# Patient Record
(demographics unavailable — no encounter records)

---

## 2024-10-08 NOTE — PHYSICAL EXAM
[FreeTextEntry1] : SC present for exam  [de-identified] :  Mastectomy site healing well, seroma noted at left mastectomy but continues to improve

## 2024-10-08 NOTE — ASSESSMENT
[FreeTextEntry1] : IMP:  77yo F w/ known left breast cancer s/p lumpectomies 6/2019 and 4/2022 -- with no adjuvant RT or endocrine therapy (pt with follow-up) now with known multifocal disease to Left breast   Left total mastectomy and L axillary node dissection on 8/23/2024. Pathology: 0/7 LN negative, invasive ductal carcinoma, 3.4 cm, invasive lobular carcinoma, 0.7 cm. negative margins. ER/[place] + HER2-  9/3-RULA removed in office 9/10- 160 cc aspirated  9/17/24: 130 cc aspirated No fluid returned today. Swelling much improved.   PLAN: Return in 6 weeks F/U w/ med/onc- pt still contemplating if she wants to see med/onc   All medical entries were at my, Dr. Bryon Kathleen, direction. I have reviewed the chart and agree that the record accurately reflects my personal performance of the history, physical exam, assessment and plan. Our office Nurse Practitioner was present of the duration of the office visit.

## 2024-10-08 NOTE — HISTORY OF PRESENT ILLNESS
[de-identified] : Ms. MACY SANDOVAL is a 76-year-old woman who returns for a post op visit.  She was initially seen in consultation on 2/1/2022.  She has a prior history of invasive ductal carcinoma of the right breast status post lumpectomy 6/2019 with Dr. Tashi Fountain (pathology T1N0, ER+/[place]+/HER2-, negative margins). She did not undergo radiation and is under the impression that the pathology was benign.     She is s/p Bilateral breast lumpectomies after Magseed placement, four sites on the left, one on the right and left axillary SLNB on 4/18/2022.  **It is of note that I strongly encouraged the patient to undergo mastectomies, but she was adamant about breast conservation. 1) Left breast medial lumpectomy: 2.5 cm IDC and DCIS, 2 left sentinel nodes w/ metastatic disease (T2N1a) ER+/[place]+/HER2- (*DCIS involves the inferior margin) 2) Left breast lateral lumpectomy: DCIS 3) Right lumpectomy: LCIS, ADH  B/L mammo/sono 7/17/23:  Irregular focal asymmetry in the lower inner left breast by mammography corresponding to the palpable area of concern and corresponding to suspicious heterogeneous masses at 9:00 and 10:00 on sonography. Ultrasound-guided core biopsy of the mass at 9:00 is recommended. Further management of the mass at 10:00 will depend on the results of this biopsy. BIRADS 5   Left breast 9:00 N4 biopsy 12/6/23: Invasive moderately differentiated ductal carcinoma, with solid papillary and mucinous features. Measures 13mm.   Her past medical history includes hypertension, hyperlipidemia (patient defers medical management), intermittent asthma (uses rescue inhaler on occasion) and constipation (on linzess).  She has a family history of breast cancer involving her maternal aunt (post-menopausal)  She is adamant that she will not have a mastectomy.  breast MRI 1/2024  - bx proven malignancy to central inner breast extending to surface measuring up to 2.6 cm, additional mass in the subareolar left breast also highly suspicious for malignancy, additional clumped nonmass enhancement in the central slightly lateral left breast, spanning 6.3 cm suspicious for additional disease  MR biopsies 3/5/2024 ([facility]) - left 3:00 N6 (cork): microinvasive carcinoma, DCIS - Left RA (stoplight): invasive mammary ductal carcinoma w/ mucinous features in this sampling, DCIS   5/21/2024: Tele health discussion included Macy and her niece. Macy is still adamant of having a lumpectomy, but her niece understands the importance of the plan for a double mastectomy.   6/4/2024: Patient presents for discussion of mastectomy with her niece.  The patient now understands the rationale for the mastectomy I explained that it could be done with immediate reconstruction so the patient was referred to a plastic surgeon to discuss reconstructive options.  Left total mastectomy and L axillary node dissection on 8/23/2024. Pathology: 0/7 LN negative, invasive ductal carcinoma, 3.4 cm, invasive lobular carcinoma, 0.7 cm. negative margins. ER/[place] + HER2-   9/10/2024: Macy presents with left breast mastectomy bed seroma and discomfort  9/17/2024: Macy presents continuous left breast mastectomy bed seroma and discomfort  10/8/2024: Macy presents with discomfort. Seroma continues to improve

## 2024-10-08 NOTE — HISTORY OF PRESENT ILLNESS
[de-identified] : Ms. MACY SANDOVAL is a 76-year-old woman who returns for a post op visit.  She was initially seen in consultation on 2/1/2022.  She has a prior history of invasive ductal carcinoma of the right breast status post lumpectomy 6/2019 with Dr. Tashi Fountain (pathology T1N0, ER+/MD+/HER2-, negative margins). She did not undergo radiation and is under the impression that the pathology was benign.     She is s/p Bilateral breast lumpectomies after Magseed placement, four sites on the left, one on the right and left axillary SLNB on 4/18/2022.  **It is of note that I strongly encouraged the patient to undergo mastectomies, but she was adamant about breast conservation. 1) Left breast medial lumpectomy: 2.5 cm IDC and DCIS, 2 left sentinel nodes w/ metastatic disease (T2N1a) ER+/MD+/HER2- (*DCIS involves the inferior margin) 2) Left breast lateral lumpectomy: DCIS 3) Right lumpectomy: LCIS, ADH  B/L mammo/sono 7/17/23:  Irregular focal asymmetry in the lower inner left breast by mammography corresponding to the palpable area of concern and corresponding to suspicious heterogeneous masses at 9:00 and 10:00 on sonography. Ultrasound-guided core biopsy of the mass at 9:00 is recommended. Further management of the mass at 10:00 will depend on the results of this biopsy. BIRADS 5   Left breast 9:00 N4 biopsy 12/6/23: Invasive moderately differentiated ductal carcinoma, with solid papillary and mucinous features. Measures 13mm.   Her past medical history includes hypertension, hyperlipidemia (patient defers medical management), intermittent asthma (uses rescue inhaler on occasion) and constipation (on linzess).  She has a family history of breast cancer involving her maternal aunt (post-menopausal)  She is adamant that she will not have a mastectomy.  breast MRI 1/2024  - bx proven malignancy to central inner breast extending to surface measuring up to 2.6 cm, additional mass in the subareolar left breast also highly suspicious for malignancy, additional clumped nonmass enhancement in the central slightly lateral left breast, spanning 6.3 cm suspicious for additional disease  MR biopsies 3/5/2024 ([facility]) - left 3:00 N6 (cork): microinvasive carcinoma, DCIS - Left RA (stoplight): invasive mammary ductal carcinoma w/ mucinous features in this sampling, DCIS   5/21/2024: Tele health discussion included Macy and her niece. Macy is still adamant of having a lumpectomy, but her niece understands the importance of the plan for a double mastectomy.   6/4/2024: Patient presents for discussion of mastectomy with her niece.  The patient now understands the rationale for the mastectomy I explained that it could be done with immediate reconstruction so the patient was referred to a plastic surgeon to discuss reconstructive options.  Left total mastectomy and L axillary node dissection on 8/23/2024. Pathology: 0/7 LN negative, invasive ductal carcinoma, 3.4 cm, invasive lobular carcinoma, 0.7 cm. negative margins. ER/MD + HER2-   9/10/2024: Macy presents with left breast mastectomy bed seroma and discomfort  9/17/2024: Macy presents continuous left breast mastectomy bed seroma and discomfort  10/8/2024: Macy presents with discomfort. Seroma continues to improve

## 2024-10-08 NOTE — ASSESSMENT
[FreeTextEntry1] : IMP:  75yo F w/ known left breast cancer s/p lumpectomies 6/2019 and 4/2022 -- with no adjuvant RT or endocrine therapy (pt with follow-up) now with known multifocal disease to Left breast   Left total mastectomy and L axillary node dissection on 8/23/2024. Pathology: 0/7 LN negative, invasive ductal carcinoma, 3.4 cm, invasive lobular carcinoma, 0.7 cm. negative margins. ER/ID + HER2-  9/3-RULA removed in office 9/10- 160 cc aspirated  9/17/24: 130 cc aspirated No fluid returned today. Swelling much improved.   PLAN: Return in 6 weeks F/U w/ med/onc- pt still contemplating if she wants to see med/onc   All medical entries were at my, Dr. Bryon Kathleen, direction. I have reviewed the chart and agree that the record accurately reflects my personal performance of the history, physical exam, assessment and plan. Our office Nurse Practitioner was present of the duration of the office visit.

## 2024-10-08 NOTE — PHYSICAL EXAM
[FreeTextEntry1] : SC present for exam  [de-identified] :  Mastectomy site healing well, seroma noted at left mastectomy but continues to improve

## 2024-10-22 NOTE — CONSULT LETTER
[Dear  ___] : Dear  [unfilled], [Consult Letter:] : I had the pleasure of evaluating your patient, [unfilled]. [Please see my note below.] : Please see my note below. [Consult Closing:] : Thank you very much for allowing me to participate in the care of this patient.  If you have any questions, please do not hesitate to contact me. [Sincerely,] : Sincerely, [FreeTextEntry3] : Aayush Daniel M.D.

## 2024-10-22 NOTE — PROCEDURE
[FreeTextEntry1] : Nerve conduction studies and electromyography [FreeTextEntry2] : Rule out right carpal tunnel syndrome or cervical radicular [FreeTextEntry3] : Electrodiagnostic testing was performed in the right upper extremity.  The radial sensory potential was mildly low in amplitude.  The median and ulnar sensory potentials were normal.  The radial and ulnar conduction velocities were mildly slow.  There was slowing of median conduction across the wrist segment.  The median motor distal latency was very prolonged.  The median compound muscle action potential was normal and conduction velocity was mildly slow.  The ulnar motor distal latency was mildly prolonged but the compound muscle action potential and conduction velocity were normal.  Recording the first dorsal interosseous, the compound muscle action potential was mildly low in amplitude and distal motor latency was approaching the upper limit of normal.  The median F wave was prolonged.  The ulnar F wave was normal.  Needle electromyography was performed in several right upper extremity and cervical paraspinal muscles.  There was mild spontaneous activity in the abductor pollicis brevis.  All other muscles were silent at rest.  There was mildly decreased recruitment in the abductor pollicis brevis.  All other muscles recruited normally.  Conclusions: This was an abnormal study.  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  There were only mild acute and chronic motor axon changes.  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  There was no electrophysiologic evidence of right cervical radiculopathy, brachial plexopathy or motor polyneuropathy.  The mildly low amplitude radial sensory potential was of uncertain significance with possibilities including a radial sensory mononeuropathy or a sensory polyneuropathy.

## 2024-10-28 NOTE — ASSESSMENT
[FreeTextEntry1] : 76F here for chronic UTI and Bl nephrolithiasis  #Chronic UTI -Pt to start on vaginal Estrace if okay'd by surgical oncologist -WIll prescribe ellura  #nephrolithiasis Pt to get Renal ULS and do LL in 6 months

## 2024-10-28 NOTE — HISTORY OF PRESENT ILLNESS
[Urinary Urgency] : urinary urgency [Nocturia] : nocturia [None] : None [FreeTextEntry1] : : Dec 22 1947  Referring Provider: none   HPI: Ms. MACY SANDOVAL is a 74 year yo F with a PMHx notable for invasive ductal carcinoma of right breast (s/p lumpectomy , T1BN0, ER+/[place]+/HER2-) with colonic thickening monitored by colonoscopy, HTN and HLD, intermittent asthma, constipation (on linzess) here today for follow up for kidney stones.   Prior LL demonstrated: good UOP, SS CaOx, elevated Ox, SS CaP and elevated urinary pH with elevated Suzan. Pt was advised to increase fluid intake and reduce oxalate intake. She returns today for follow-up. She must avoid salt, increase her citrate/protein intake and reduce her oxalate intake. She has no flank pain or fever. She has not been seen in 2 years. She switched to vegan diet 2022 as a form of therapy for her breast cancer.  She had US in 2020 that showed 5mm RLP and 5mm LLP stones. No hydro. Pt denies flank pain and heamturia.  : CT scan with bilateral (R sided 3, L sided 1) punctate 1-2 mm stones. No flank pain and discomfort. Litholink submitted, pending review. Diet modification reviewed at length- increasing fluids (primarily water), citrus is good, and decreasing/moderating salt, animal flesh protein, oxalate containing foods, and moderation of calcium intake (1000 mg/day is USRDA). I reviewed with the patient the risks of metabolic stone disease given their underlying risk parameters (all of which include large stones, multiple stones, bilateral stones, family history, and young age), and the indications for 24 hour urine metabolic assessment. We also discussed benefits of regular exercise and weight loss as independent risk reducers for stones  2023: Telehealth visit today on [facility]. History of recent health, disease symptoms and mediation review completed. Limited exam but on video able to view limited exam of mucous membranes, skin, demonstration of joint range of motion, gain. No medication side effects noted. Discussed lab testing. Verbal consent given on 2023 at 13:24 by MACY SANDOVAL.  Here today to discuss results of recent litholink analysis. Found to have excellent volume, yet Oxalate level high at 60s, patient endorses eating spinach salads. We discussed her SS CaOX ratio which is elevated and oxalate should be <40. Urine volume excellent at 2.2 L. Also noted to have high salt intake, Suzan around 222. Discussed low salt diet and increasing hydration and potentially swapping out spinach for other greens that are lower in oxalate load.   Also having recurrent UTIs, has seen urgent care several times. Will come in to discuss. Currently asymptomatic.   24 Pt reports having UTI back in October - was treated with Bactrim (was away at [facility]) - had a rash and flu like symptoms- changed to Cipro once back to [place]. Pt reports feeling like she is getting an UTI now- UA and UCx sent. Pt reports having some urgency this morning- maybe due to diuretic- has hx of constipation- on Linzess.   10/28/2024: Reviewed LL with patient, good UOP intake, sodium is elevated- counseled to decrease sodium intake, oxalate numbers wnl. Reviewed ultrasound with bilateral punctate stones. UTIx2 that patient reports since last visit, patient did not start vaginal estrogen as she forgot to ask surgical oncologist if it was okay, also did not take ellura. Plan for ULS/LL in 6 mo, start estrace cream after checking with oncologist, also ellura daily, x2 on days she feels she is having infection. Continuing to work on her constipation with GI, on LInzess. LL d/w patient, good volume, Citrate within range and oxalate 44 down from 60; Suzan 200s, discussed low salt diet.  [Urinary Incontinence] : no urinary incontinence [Urinary Retention] : no urinary retention [Urinary Frequency] : no urinary frequency [Straining] : no straining [Weak Stream] : no weak stream [Intermittency] : no intermittency [Post-Void Dribbling] : no post-void dribbling [Dysuria] : no dysuria [Hematuria - Gross] : no gross hematuria

## 2024-12-17 NOTE — PHYSICAL EXAM
[Rad] : radial 2+ and symmetric bilaterally [Normal] : Alert and in no acute distress [Poor Appearance] : well-appearing [Acute Distress] : not in acute distress [Obese] : not obese [de-identified] : The patient has no respiratory distress. Mood and affect are normal. The patient is alert and oriented to person, place and time. Examination of the cervical spine demonstrates no tenderness, no deformity and no muscle spasm. Cervical spine rotation is 60 to the right, 60 to the left, 75 of extension and 45 of flexion. Neurologic exam of the upper extremities reveals intact sensation to light touch. Motor function is 5 over 5 in all groups. Deep tendon reflexes are 2+ and equal at the biceps, triceps and brachioradialis. Examination of the left shoulder demonstrates tenderness.  There is painful motion beyond 50 degrees of flexion and abduction.  Skin is intact.  There is no lymphedema. [de-identified] : AP, transscapular and axillary x-rays of the left shoulder demonstrated comminuted proximal humeral fracture with evidence of healing.  The position is not anatomic but is acceptable.

## 2024-12-17 NOTE — HISTORY OF PRESENT ILLNESS
[de-identified] : 76-year-old female presents for evaluation of left shoulder pain x 1 month. She reports falling on the shoulder on 11/13/24. She went to the ER and had x-rays which were reportedly negative. She complains of intermittent sharp pain over the anterior shoulder worse with reaching overhead, behind her back and getting dressed. She does report some pain in her axilla as well which she feels is due to some swelling. She has been taking Advil with mild relief.

## 2024-12-17 NOTE — DISCUSSION/SUMMARY
[de-identified] : The patient has a fracture of the left proximal humerus.  The fracture occurred 1 month ago and there is evidence of healing.  Patient will work on early range of motion at this time.  She will avoid heavy lifting.  She will be reevaluated with new x-rays in 3 weeks.

## 2025-01-14 NOTE — ASSESSMENT
[FreeTextEntry1] : IMP:  76yo F w/ known left breast cancer s/p lumpectomies 6/2019 and 4/2022 -- with no adjuvant RT or endocrine therapy (pt with follow-up) now with known multifocal disease to Left breast   Left total mastectomy and L axillary node dissection on 8/23/2024. Pathology: 0/7 LN negative, invasive ductal carcinoma, 3.4 cm, invasive lobular carcinoma, 0.7 cm. negative margins. ER/[place] + HER2-  9/3-RULA removed in office 9/10- 160 cc aspirated  9/17/24: 130 cc aspirated  Pt decided not to return to see Dr. Rothman and is declining endocrine therapy  PLAN: Return in one year R MMG/US now    All medical entries were at my, Dr. Bryon Kathleen, direction. I have reviewed the chart and agree that the record accurately reflects my personal performance of the history, physical exam, assessment and plan. Our office Nurse Practitioner was present of the duration of the office visit.

## 2025-01-14 NOTE — ASSESSMENT
[FreeTextEntry1] : IMP:  78yo F w/ known left breast cancer s/p lumpectomies 6/2019 and 4/2022 -- with no adjuvant RT or endocrine therapy (pt with follow-up) now with known multifocal disease to Left breast   Left total mastectomy and L axillary node dissection on 8/23/2024. Pathology: 0/7 LN negative, invasive ductal carcinoma, 3.4 cm, invasive lobular carcinoma, 0.7 cm. negative margins. ER/[place] + HER2-  9/3-RULA removed in office 9/10- 160 cc aspirated  9/17/24: 130 cc aspirated  Pt decided not to return to see Dr. Rothman and is declining endocrine therapy  PLAN: Return in one year R MMG/US now    All medical entries were at my, Dr. Bryon Kathleen, direction. I have reviewed the chart and agree that the record accurately reflects my personal performance of the history, physical exam, assessment and plan. Our office Nurse Practitioner was present of the duration of the office visit.

## 2025-01-14 NOTE — PHYSICAL EXAM
[Normal] : supple, no neck mass and thyroid not enlarged [Normal Neck Lymph Nodes] : normal neck lymph nodes  [Normal Supraclavicular Lymph Nodes] : normal supraclavicular lymph nodes [Normal Axillary Lymph Nodes] : normal axillary lymph nodes [Normal] : oriented to person, place and time, with appropriate affect [FreeTextEntry1] : SC present for exam  [de-identified] : Normal S1,S2. Regular rate and rhythm [de-identified] : Right normal breast exam, left mastectomy with flat wall closure, no evidence of recurrence [de-identified] : Clear breath sounds bilaterally with normal respiratory effort.

## 2025-01-14 NOTE — PHYSICAL EXAM
[Normal] : supple, no neck mass and thyroid not enlarged [Normal Neck Lymph Nodes] : normal neck lymph nodes  [Normal Supraclavicular Lymph Nodes] : normal supraclavicular lymph nodes [Normal Axillary Lymph Nodes] : normal axillary lymph nodes [Normal] : oriented to person, place and time, with appropriate affect [FreeTextEntry1] : SC present for exam  [de-identified] : Normal S1,S2. Regular rate and rhythm [de-identified] : Right normal breast exam, left mastectomy with flat wall closure, no evidence of recurrence [de-identified] : Clear breath sounds bilaterally with normal respiratory effort.

## 2025-01-14 NOTE — HISTORY OF PRESENT ILLNESS
[de-identified] : Ms. MACY SANDOVAL is a 77-year-old woman who returns for a follow up visit  She was initially seen in consultation on 2/1/2022.  She has a prior history of invasive ductal carcinoma of the right breast status post lumpectomy 6/2019 with Dr. Tashi Fountain (pathology T1N0, ER+/KY+/HER2-, negative margins). She did not undergo radiation and is under the impression that the pathology was benign.     She is s/p Bilateral breast lumpectomies after Magseed placement, four sites on the left, one on the right and left axillary SLNB on 4/18/2022.  **It is of note that I strongly encouraged the patient to undergo mastectomies, but she was adamant about breast conservation. 1) Left breast medial lumpectomy: 2.5 cm IDC and DCIS, 2 left sentinel nodes w/ metastatic disease (T2N1a) ER+/KY+/HER2- (*DCIS involves the inferior margin) 2) Left breast lateral lumpectomy: DCIS 3) Right lumpectomy: LCIS, ADH  B/L mammo/sono 7/17/23:  Irregular focal asymmetry in the lower inner left breast by mammography corresponding to the palpable area of concern and corresponding to suspicious heterogeneous masses at 9:00 and 10:00 on sonography. Ultrasound-guided core biopsy of the mass at 9:00 is recommended. Further management of the mass at 10:00 will depend on the results of this biopsy. BIRADS 5   Left breast 9:00 N4 biopsy 12/6/23: Invasive moderately differentiated ductal carcinoma, with solid papillary and mucinous features. Measures 13mm.   Her past medical history includes hypertension, hyperlipidemia (patient defers medical management), intermittent asthma (uses rescue inhaler on occasion) and constipation (on linzess).  She has a family history of breast cancer involving her maternal aunt (post-menopausal)  She is adamant that she will not have a mastectomy.  breast MRI 1/2024  - bx proven malignancy to central inner breast extending to surface measuring up to 2.6 cm, additional mass in the subareolar left breast also highly suspicious for malignancy, additional clumped nonmass enhancement in the central slightly lateral left breast, spanning 6.3 cm suspicious for additional disease  MR biopsies 3/5/2024 ([facility]) - left 3:00 N6 (cork): microinvasive carcinoma, DCIS - Left RA (stoplight): invasive mammary ductal carcinoma w/ mucinous features in this sampling, DCIS   5/21/2024: Tele health discussion included Macy and her niece. Macy is still adamant of having a lumpectomy, but her niece understands the importance of the plan for a double mastectomy.   6/4/2024: Patient presents for discussion of mastectomy with her niece.  The patient now understands the rationale for the mastectomy I explained that it could be done with immediate reconstruction so the patient was referred to a plastic surgeon to discuss reconstructive options.  Left total mastectomy and L axillary node dissection on 8/23/2024. Pathology: 0/7 LN negative, invasive ductal carcinoma, 3.4 cm, invasive lobular carcinoma, 0.7 cm. negative margins. ER/KY + HER2-   9/10/2024: Macy presents with left breast mastectomy bed seroma and discomfort  9/17/2024: Macy presents continuous left breast mastectomy bed seroma and discomfort  10/8/2024: Macy presents with discomfort. Seroma continues to improve  1/14/2025: Here for a check up, recently fell at her laundromat in Nov 2024 and fractured her left shoulder.
[de-identified] : Ms. MACY SANDOVAL is a 77-year-old woman who returns for a follow up visit  She was initially seen in consultation on 2/1/2022.  She has a prior history of invasive ductal carcinoma of the right breast status post lumpectomy 6/2019 with Dr. Tashi Fountain (pathology T1N0, ER+/AK+/HER2-, negative margins). She did not undergo radiation and is under the impression that the pathology was benign.     She is s/p Bilateral breast lumpectomies after Magseed placement, four sites on the left, one on the right and left axillary SLNB on 4/18/2022.  **It is of note that I strongly encouraged the patient to undergo mastectomies, but she was adamant about breast conservation. 1) Left breast medial lumpectomy: 2.5 cm IDC and DCIS, 2 left sentinel nodes w/ metastatic disease (T2N1a) ER+/AK+/HER2- (*DCIS involves the inferior margin) 2) Left breast lateral lumpectomy: DCIS 3) Right lumpectomy: LCIS, ADH  B/L mammo/sono 7/17/23:  Irregular focal asymmetry in the lower inner left breast by mammography corresponding to the palpable area of concern and corresponding to suspicious heterogeneous masses at 9:00 and 10:00 on sonography. Ultrasound-guided core biopsy of the mass at 9:00 is recommended. Further management of the mass at 10:00 will depend on the results of this biopsy. BIRADS 5   Left breast 9:00 N4 biopsy 12/6/23: Invasive moderately differentiated ductal carcinoma, with solid papillary and mucinous features. Measures 13mm.   Her past medical history includes hypertension, hyperlipidemia (patient defers medical management), intermittent asthma (uses rescue inhaler on occasion) and constipation (on linzess).  She has a family history of breast cancer involving her maternal aunt (post-menopausal)  She is adamant that she will not have a mastectomy.  breast MRI 1/2024  - bx proven malignancy to central inner breast extending to surface measuring up to 2.6 cm, additional mass in the subareolar left breast also highly suspicious for malignancy, additional clumped nonmass enhancement in the central slightly lateral left breast, spanning 6.3 cm suspicious for additional disease  MR biopsies 3/5/2024 ([facility]) - left 3:00 N6 (cork): microinvasive carcinoma, DCIS - Left RA (stoplight): invasive mammary ductal carcinoma w/ mucinous features in this sampling, DCIS   5/21/2024: Tele health discussion included Macy and her niece. Macy is still adamant of having a lumpectomy, but her niece understands the importance of the plan for a double mastectomy.   6/4/2024: Patient presents for discussion of mastectomy with her niece.  The patient now understands the rationale for the mastectomy I explained that it could be done with immediate reconstruction so the patient was referred to a plastic surgeon to discuss reconstructive options.  Left total mastectomy and L axillary node dissection on 8/23/2024. Pathology: 0/7 LN negative, invasive ductal carcinoma, 3.4 cm, invasive lobular carcinoma, 0.7 cm. negative margins. ER/AK + HER2-   9/10/2024: Macy presents with left breast mastectomy bed seroma and discomfort  9/17/2024: Macy presents continuous left breast mastectomy bed seroma and discomfort  10/8/2024: Macy presents with discomfort. Seroma continues to improve  1/14/2025: Here for a check up, recently fell at her laundromat in Nov 2024 and fractured her left shoulder.
range of motion is not limited and there is no muscle tenderness.

## 2025-01-23 NOTE — HISTORY OF PRESENT ILLNESS
Review of Systems/Medical History  Patient summary reviewed  Chart reviewed  No history of anesthetic complications     Cardiovascular  Hyperlipidemia, Hypertension ,    Pulmonary  Negative pulmonary ROS        GI/Hepatic  Negative GI/hepatic ROS          Chronic kidney disease ,        Endo/Other  Diabetes well controlled type 2 Insulin,   Obesity    GYN       Hematology  Negative hematology ROS      Musculoskeletal  Negative musculoskeletal ROS        Neurology  Negative neurology ROS   Diabetic neuropathy,    Psychology   Negative psychology ROS              Physical Exam    Airway    Mallampati score: III  TM Distance: >3 FB  Neck ROM: full     Dental   No notable dental hx     Cardiovascular  Rhythm: regular, Rate: normal,     Pulmonary  Breath sounds clear to auscultation,     Other Findings        Anesthesia Plan  ASA Score- 3     Anesthesia Type- general with ASA Monitors  Additional Monitors:   Airway Plan: LMA  Plan Factors-Patient not instructed to abstain from smoking on day of procedure  Patient did not smoke on day of surgery  Induction- intravenous  Postoperative Plan- Plan for postoperative opioid use  Planned trial extubation    Informed Consent- Anesthetic plan and risks discussed with patient  [de-identified] : Patient presents for reevaluation of left proximal humerus fracture, 11 weeks from injury. She still has some difficulty lifting her arm while getting dressed.

## 2025-01-23 NOTE — DISCUSSION/SUMMARY
[de-identified] : The patient's left proximal humeral fracture has healed.  She has stiffness of the shoulder.  She is referred for physical therapy.  She will be reevaluated in 6 weeks.  She is having triggering of her left long finger and is under the care of Dr. Diaz for this condition.  She is advised to speak with him regarding her current condition.

## 2025-01-23 NOTE — PHYSICAL EXAM
[Rad] : radial 2+ and symmetric bilaterally [Normal] : Alert and in no acute distress [Poor Appearance] : well-appearing [Acute Distress] : not in acute distress [Obese] : not obese [de-identified] : The patient has no respiratory distress. Mood and affect are normal. The patient is alert and oriented to person, place and time. Examination of the cervical spine demonstrates no tenderness, no deformity and no muscle spasm. Cervical spine rotation is 60 to the right, 60 to the left, 75 of extension and 45 of flexion. Neurologic exam of the upper extremities reveals intact sensation to light touch. Motor function is 5 over 5 in all groups. Deep tendon reflexes are 2+ and equal at the biceps, triceps and brachioradialis. Examination of the left shoulder demonstrates no tenderness.  She has elevation of 80 degrees and external rotation of 20 degrees.  Elbow motion is pain-free.  The skin is intact.  There is no lymphedema.  She has stiffness of the left long finger. [de-identified] : AP, transscapular and axillary x-rays of the left shoulder demonstrate union of the proximal humeral fracture.

## 2025-02-13 NOTE — PHYSICAL EXAM
[FreeTextEntry1] : Constitutional: alert. Psychiatric: oriented to person, place, and time. Neurologic:  Orientation: oriented to person, oriented to place and oriented to time.  Memory: short term memory intact.  Language: fluency intact.  Fund of knowledge: displays adequate knowledge of current events.  Cranial Nerves: visual acuity intact bilaterally, visual fields full to confrontation, pupils equal round and reactive to light, extraocular motion intact, facial sensation intact symmetrically, face symmetrical, hearing was intact bilaterally, head turning and shoulder shrug symmetric and there was no tongue deviation with protrusion.  Motor: muscle tone was normal in all four extremities and muscle strength was normal in all four extremities.  Sensory exam: Decreased to light touch in the feet Coordination:. Finger to nose dysmetria was not present.  normal gait Deep tendon reflexes: Biceps right 1+. Biceps left 1+. Patella right 1+. Patella left 1+.

## 2025-02-13 NOTE — DISCUSSION/SUMMARY
[FreeTextEntry1] : 77-year-old woman who is here for initial consultation of peripheral neuropathy that might be due to her prediabetic state.  Patient reports no history of alcohol use or chemotherapy use.  Will check for causes of neuropathy along with nerve conduction and EMG studies.  Patient declined any neuropathic medication at this time.  Patient was advised to forward her records that might have shown the myelopathy.(Of note patient is not sure what level of the myelopathy was).  I spent the time noted on the day of this patient encounter preparing for, review of medical records,review of pertinent diagnostic studies, providing and documenting the above E/M service and counseling and educate patient on differential, workup, disease course, and treatment/management. Education was provided to the patient during this encounter. All questions and concerns were answered and addressed in detail. The patient verbalized understanding and agreed to plan. Patient was advised to continue to monitor for neurologic symptoms and to notify my office or go to the nearest emergency room if there are any changes. Any orders/referrals and communications were provided as well. Side effects of the above medications were discussed in detail including but not limited to applicable black box warning and teratogenicity as appropriate. Patient was advised to bring previous records to my office. A copy of the consult note will be sent to the referring physician.

## 2025-02-13 NOTE — HISTORY OF PRESENT ILLNESS
[FreeTextEntry1] : 77-year-old woman who is here for initial consultation of 20 years history of neuropathy.  Patient describes the needles and numbness from mid foot to her shins and patient came with no other medical records.  There is a mention of myelopathy in 2020 in her chart however patient has no recollection of this.  Patient is not interested in any neuropathic medications but would like to get to the bottom of what might be causing her neuropathy.

## 2025-03-07 NOTE — PHYSICAL EXAM
[Rad] : radial 2+ and symmetric bilaterally [Normal] : Alert and in no acute distress [Poor Appearance] : well-appearing [Acute Distress] : not in acute distress [Obese] : not obese [de-identified] : The patient has no respiratory distress. Mood and affect are normal. The patient is alert and oriented to person, place and time. Examination of the cervical spine demonstrates no tenderness, no deformity and no muscle spasm. Cervical spine rotation is 60 to the right, 60 to the left, 75 of extension and 45 of flexion. Neurologic exam of the upper extremities reveals intact sensation to light touch. Motor function is 5 over 5 in all groups. Deep tendon reflexes are 2+ and equal at the biceps, triceps and brachioradialis. Examination of the left shoulder demonstrates no tenderness.  She has elevation of 90 degrees and external rotation of 20 degrees.  Elbow motion is pain-free.  There is no elbow tenderness.  The skin is intact.  There is no lymphedema.  She has stiffness of the left long finger. [de-identified] : AP, lateral and oblique x-rays of the left elbow demonstrate no fracture, no dislocation and no bony abnormality.

## 2025-03-07 NOTE — DISCUSSION/SUMMARY
[de-identified] : The patient still has stiffness in the left shoulder following fracture.  She would benefit from continued physical therapy.  In terms of her left elbow her exam is fairly benign.  She is likely having some pain because of decreased function at her left shoulder.  She will resume physical therapy.  She will be reevaluated in 1 month.

## 2025-03-07 NOTE — HISTORY OF PRESENT ILLNESS
[de-identified] : Patient presents for reevaluation of left proximal humerus fracture. Her fracture was healed on last x-rays taken 1/23/25. She has been doing PT which she is finding very helpful. She complains of left elbow pain which she reports she has had since her fall. Pain is worse with lifting and bending.

## 2025-03-15 NOTE — HISTORY OF PRESENT ILLNESS
[de-identified] : Patient is a 77 F PMH b/l breast CA, HTN, HLD, asthma, comes in for follow up   Patient was called in February to discuss lipid panel, due to concerns for allergy she switched simvastatin to Rosuvastatin. She also had UTI symptoms and got cipro -> Rash. Sent referral for AI. Patient has neuropathy went to see neurology and they said they read in chart that they have myelopathy. Saw Mimi Eckert (Neurology) MD who said that she had myelopathy. Her main issue is this. Started Rosuvastatin a few days back. She had concerns about some of her blood pressure medications. She is taking metoprolol and HCTZ for blood pressure. She feels the HCTZ is causing her skin to be dry. In addition, she requested a consult for a DEXA scan (patient is due cinthia schreiber) and also for a MRI of her spine due to radiculopathy.   Outside of this, her only issue was some urinary discomfort.

## 2025-03-15 NOTE — HISTORY OF PRESENT ILLNESS
[de-identified] : Patient is a 77 F PMH b/l breast CA, HTN, HLD, asthma, comes in for follow up   Patient was called in February to discuss lipid panel, due to concerns for allergy she switched simvastatin to Rosuvastatin. She also had UTI symptoms and got cipro -> Rash. Sent referral for AI. Patient has neuropathy went to see neurology and they said they read in chart that they have myelopathy. Saw Mimi Eckert (Neurology) MD who said that she had myelopathy. Her main issue is this. Started Rosuvastatin a few days back. She had concerns about some of her blood pressure medications. She is taking metoprolol and HCTZ for blood pressure. She feels the HCTZ is causing her skin to be dry. In addition, she requested a consult for a DEXA scan (patient is due cinthia schreiber) and also for a MRI of her spine due to radiculopathy.   Outside of this, her only issue was some urinary discomfort.

## 2025-03-15 NOTE — HISTORY OF PRESENT ILLNESS
[de-identified] : Patient is a 77 F PMH b/l breast CA, HTN, HLD, asthma, comes in for follow up   Patient was called in February to discuss lipid panel, due to concerns for allergy she switched simvastatin to Rosuvastatin. She also had UTI symptoms and got cipro -> Rash. Sent referral for AI. Patient has neuropathy went to see neurology and they said they read in chart that they have myelopathy. Saw Mimi Eckert (Neurology) MD who said that she had myelopathy. Her main issue is this. Started Rosuvastatin a few days back. She had concerns about some of her blood pressure medications. She is taking metoprolol and HCTZ for blood pressure. She feels the HCTZ is causing her skin to be dry. In addition, she requested a consult for a DEXA scan (patient is due cinthia schreiber) and also for a MRI of her spine due to radiculopathy.   Outside of this, her only issue was some urinary discomfort.

## 2025-03-15 NOTE — ASSESSMENT
[FreeTextEntry1] : Patient is a 77 F PMH b/l breast CA, HTN, HLD, asthma, comes in for follow up for blood pressure management   #HTN Patient reported blood pressures 130-140s. Her homer regiment includes Metoprolol and Hydrochlorothiazide. She was prescribed them at our clinic a while back. She feels that the HCTZ makes her skin dry and her to urinate excessively. Patient is on an outdated blood pressure regiment and would benefit from switching to something like amlodipine. Patient will likely need to be on additional agent if her blood pressure persists >140.  - Discontinued Metoprolol + HCTZ - Started Amlodipine 5mg - Encouraged patient to check home blood pressures more often.   #Myelopathy  Patient's neurologist wanted to know more information about her EMR diagnosis "Myelopathy". Per MRI no reports for myelopathy. Reviewed patient's past EMG's (has had 4, all did not state anything about myelopathy) Was seen by nuerology in the past as well.  - ordered MRI to rule out and neoplastic causes and for further procedure guidance (f they need epidurals/etc)  - Will send a message to neurology about findings.   #HCM  - Patient is due for DEXA scan -> Ordered  patient

## 2025-03-15 NOTE — ASSESSMENT
[FreeTextEntry1] : Patient is a 77 F PMH b/l breast CA, HTN, HLD, asthma, comes in for follow up for blood pressure management   #HTN Patient reported blood pressures 130-140s. Her homer regiment includes Metoprolol and Hydrochlorothiazide. She was prescribed them at our clinic a while back. She feels that the HCTZ makes her skin dry and her to urinate excessively. Patient is on an outdated blood pressure regiment and would benefit from switching to something like amlodipine. Patient will likely need to be on additional agent if her blood pressure persists >140.  - Discontinued Metoprolol + HCTZ - Started Amlodipine 5mg - Encouraged patient to check home blood pressures more often.   #Myelopathy  Patient's neurologist wanted to know more information about her EMR diagnosis "Myelopathy". Per MRI no reports for myelopathy. Reviewed patient's past EMG's (has had 4, all did not state anything about myelopathy) Was seen by nuerology in the past as well.  - ordered MRI to rule out and neoplastic causes and for further procedure guidance (f they need epidurals/etc)  - Will send a message to neurology about findings.   #HCM  - Patient is due for DEXA scan -> Ordered

## 2025-04-03 NOTE — DISCUSSION/SUMMARY
[FreeTextEntry1] : Patient sed rate was mildly elevated and we will repeat.  It may be related to her UTI. She has no records that mentioned myelopathy. She states she may have confused it with neuropathy during the last visit.

## 2025-04-03 NOTE — PROCEDURE
[FreeTextEntry1] :                                                                            Clinical  Neurophysiology  Laboratory [address] [place] [address]  EMG/NCV REPORT    Full Name:	Sandy Valentin	YOB: 1947 Gender:	Female    Visit Date:	4/3/2025 14:53 Age:	77 Years 3 Months Old Examining Physician:	Tomeka Referring Physician:	Tomeka Height:	5 feet 5 inch Indication:	     Summary  Sensory nerve conductions Left sural sensory nerve action potential had normal latency, decreased amplitude and normal conduction velocity.  Motor nerve conductions Left peroneal nerve compound motor action potential had normal latency, decreased amplitude and decreased conduction velocity.  Right peroneal nerve compound motor action potential had normal latency, decreased amplitude and normal conduction velocity.  Left tibial nerve compound motor action potential had normal latency, normal amplitude and normal conduction velocity.  Right tibial nerve compound motor action potential had normal latency, decreased amplitude and normal conduction velocity.  Left peroneal nerve compound motor action potential recording at tibialis anterior muscle had normal latency, decreased amplitude and normal conduction velocity.  Bilateral peroneal and right tibial F-wave latencies were within normal limits.  Left tibial F-wave latency was prolonged.  Needle EMG Needle EMG was performed using a disposable concentric needle in the following muscles: The left tibialis anterior, medial gastrocnemius, long head of the biceps femoris, lateralis and gluteus medius muscles had no spontaneous activity and normal motor units.  Summary:abnormal study. There is electrophysiologic evidence of sensorimotor large fiber neuropathy in the lower extremities.  Patient sed rate was mildly elevated and we will repeat.  It may be related to her UTI. She has no records that mentioned myelopathy. She states she may have confused it with neuropathy during the last visit.  Clinical and radiologic correlation is advised.  Thank you for the consult, Yg Eckert MD Diplomat, American Board of Neurology and Psychiatry    Sensory NCS    Nerve / Sites	Rec. Site	Onset Lat	Peak Lat	NP Amp	PP Amp	Segments	Distance	Velocity 		ms	ms	V	V		cm	m/s L Sural - Ankle (Calf)    Calf	Ankle	2.60	3.59	1.1	13.7	Calf - Ankle	14	54       Motor NCS    Nerve / Sites	Muscle	Latency	Amplitude	Amp %	Duration	Segments	Distance	Lat Diff	Velocity 		ms	mV	%	ms		cm	ms	m/s L Peroneal - EDB    Ankle	EDB	5.68	1.1	100	3.80	Ankle - EDB	8		    Fib head	EDB	13.70	1.4	128	3.91	Fib head - Ankle	29	8.02	36    Pop fossa	EDB	16.35	1.9	175	3.70	Pop fossa - Fib head	8	2.66	30 R Peroneal - EDB    Ankle	EDB	4.69	1.4	100	5.21	Ankle - EDB	7		    Fib head	EDB	11.35	1.1	75.6	6.20	Fib head - Ankle	32	6.67	48    Pop fossa	EDB	13.23	1.8	129	7.08	Pop fossa - Fib head	8	1.88	43 L Tibial - AH    Ankle	AH	3.49	3.4	100	6.04	Ankle - AH	8		    Pop fossa	AH	11.41	4.8	142	7.81	Pop fossa - Ankle	32	7.92	40 R Tibial - AH    Ankle	AH	4.22	2.8	100	5.68	Ankle - AH	8		    Pop fossa	AH	12.29	2.4	85.8	6.82	Pop fossa - Ankle	33	8.07	41 L Peroneal - Tib Ant.    Fib Head	Tib Ant	4.69	2.7	100	14.48	Fib Head - Tib Ant			    Pop fossa	Tib Ant	6.46	2.5	93.1		Pop fossa - Fib Head	14	1.77	79                   F  Wave    Nerve	F Lat	M Lat	F-M Lat 	ms	ms	ms L Peroneal - EDB	28.1	3.8	24.3 L Tibial - AH	60.3	4.2	56.0 R Peroneal - EDB	54.2	4.4	49.8 R Tibial - AH	46.3	5.4	40.9                EMG       EMG Summary Table	 	Spontaneous	MUAP	Recruitment Muscle	Nerve	Roots	IA	Fib	PSW	Fasc	H.F.	Amp	Dur.	PPP	Pattern L. Tibialis anterior	Deep peroneal (Fibular)	L4-L5	N	None	None	None	None	N	N	N	N L. Gastrocnemius (Medial head)	Tibial	S1-S2	N	None	None	None	None	N	N	N	N L. Biceps femoris (long head)	Sciatic (tibial division)	L5-S2	N	None	None	None	None	N	N	N	N L. Vastus lateralis	Femoral	L2-L4	N	None	None	None	None	N	N	N	N L. Gluteus medius	Superior gluteal	L4-S1	N	None	None	None	None	N	N	N	N

## 2025-04-17 NOTE — HISTORY OF PRESENT ILLNESS
[FreeTextEntry8] : Patient is a 77 F PMH b/l breast CA, HTN, HLD, asthma, comes in for follow up for HTN  Patient reports dizziness and nauseous from amlodipine that was prescribed last time.  She feels her pulse was high when she took amlodipine. She states her HCTZ makes her dehydrated and also did not feel too great on metoprolol so it was discontinued. Patient called the clinic after starting amlodipine when she was having these symptoms and was recommended to stop it and only take it if after seeing a high reading (>140) which she did today and her symptoms recurred.  Patient also has her MRI results which was done at OSH showing severe stenosis. She clinically has some pain on exertion in that area.  Overall denies any vomiting/diarrhea/fevers. Denies any cp/sob. Her urinary issues have been stable and has not noticed anything new. Denies ha/vision changes.

## 2025-04-17 NOTE — END OF VISIT
[] : Resident [FreeTextEntry3] : acute sxs that correlated with taking new BP med per pt report. change in regimen as above

## 2025-04-17 NOTE — ASSESSMENT
[FreeTextEntry1] : Patient is a 77 F PMH b/l breast CA, HTN, HLD, asthma, comes in for follow up  #HTN Will trial losartan 25. Stop amlodipine. Counseled on BP log. Call the clinic if not able to tolerate  #Back pain Ortho referral due to severe stenosis and pain. Patient able to do her ADLs however. Will manage with heat packs, icing, rest, NSAIDS/Tylenol etc  for now till she sees ortho  #Asthma stable

## 2025-05-07 NOTE — CARDIOLOGY SUMMARY
[de-identified] : 5/6/2025 - sinus rhythm at 82 bpm, LAFB, poor R-wave progression, normal voltage ECG [de-identified] : 12/28/2015 stress echocardiogram - 6 minutes of Joseph protocol (7 METS), 93% MPHR, normal augmentation of systolic function

## 2025-05-07 NOTE — DISCUSSION/SUMMARY
[EKG obtained to assist in diagnosis and management of assessed problem(s)] : EKG obtained to assist in diagnosis and management of assessed problem(s) [FreeTextEntry1] : 77 year-old woman with abnormal ECG, fatigue, and a prior diagnosis of neuropathy. She has bilateral carpal tunnel syndrome and trigger finger. She is here today for an evaluation of an abnormal baseline ECG.  Labs sent today Echocardiogram with srain imaging to screen for amyloid cardiomyopathy Technetium pyrophosphate scan to evaluate for TTR cardiomyopathy.

## 2025-05-07 NOTE — CARDIOLOGY SUMMARY
[de-identified] : 5/6/2025 - sinus rhythm at 82 bpm, LAFB, poor R-wave progression, normal voltage ECG [de-identified] : 12/28/2015 stress echocardiogram - 6 minutes of Joseph protocol (7 METS), 93% MPHR, normal augmentation of systolic function

## 2025-05-07 NOTE — HISTORY OF PRESENT ILLNESS
[FreeTextEntry1] : Patient is a 77 year-old woman, born in [place], [place], in the  since age 20, with known past medical history of hypertension, dyslipidemia, and constipation (maintained on Linzess for many years), presents today to establish care.  Patient has a history of bilateral carpal tunnel syndrome (no surgical interventions) and trigger finger. Recent EMG shows large fiber neuropathy int he lower extremities.  Light chains checked in February 2025 were normal.

## 2025-05-13 NOTE — HISTORY OF PRESENT ILLNESS
[FreeTextEntry1] : 76 y/o  (child ) Hx of breast CA s/p left mastectomy  Patent here to establish care and annual exam  Patient seen in the ER due to elevated potassium and stopped her antihypertensives Patient with c/o frequent urination

## 2025-05-13 NOTE — DISCUSSION/SUMMARY
[FreeTextEntry1] : 76 y/o  (child ) Hx of breast CA s/p left mastectomy  Patent here to establish care and annual exam  Patient seen in the ER due to elevated potassium and stopped her antihypertensives Patient with c/o frequent urination refer to urogyn Right mammogram and right breast us BDS UTD with screening colonsocopy Urinary frequency refer to urogyn  f/u 2 years/PRN

## 2025-05-14 NOTE — PHYSICAL EXAM
[de-identified] : Examination of the lumbar spine reveals no midline tenderness palpation, step-offs, or skin lesions. Decreased range of motion with respect to flexion, extension, lateral bending, and rotation. No tenderness to palpation of the sciatic notch. No tenderness palpation of the bilateral greater trochanters. No pain with passive internal/external rotation of the hips. No instability of bilateral lower extremities.  Negative CRISS. Negative straight leg raise bilaterally. No bowstring. Negative femoral stretch. 5 out of 5 iliopsoas, hip abductors, hips adductors, quadriceps, hamstrings, gastrocsoleus, tibialis anterior, extensor hallucis longus, peroneals. Grossly intact sensation to light touch bilateral lower extremities. 1+ patellar and Achilles reflexes. Downgoing Babinski. No clonus. Intact proprioception. Palpable pulses. No skin lesion and no edema on the right and left lower extremities. [de-identified] : Review of her lumbar MRI does reveal some areas of lateral recess and foraminal stenosis.  No high-grade central stenosis.

## 2025-05-14 NOTE — DISCUSSION/SUMMARY
[de-identified] : We reviewed her imaging.  We discussed further treatment options.  She will try some physical therapy for her lumbar stenosis issues.  She is pending a cervical MRI due to some balance issues.  She will follow-up with me after her cervical MRI.

## 2025-05-14 NOTE — HISTORY OF PRESENT ILLNESS
[de-identified] : Ms. MACY SANDOVAL  is a 77 year old female who presents with a chronic history of low back and bilateral leg pain.  She has taken Tylenol with minimal relief.  Her symptoms are getting worse over time.   Normal bowel and bladder control.   Denies any recent fevers, chills, sweats, weight loss, or infection.  The patients past medical history, past surgical history, medications, allergies, and social history were reviewed by me today with the patient and documented accordingly.  In addition, the patient's family history, which is noncontributory to their visit, was also reviewed.

## 2025-05-16 NOTE — ASSESSMENT
[FreeTextEntry1] : Patient is a 77 F PMH b/l breast CA, HTN, HLD, asthma, telephonic appointment for HTN  #HTN - now not on any medication for > 2 weeks - home bp 120/80, appropriate - continue ambulatory bp measurements  patient has a schedule appointment next week  case discussed with Dr. Brigida Magana ([facility] VegasJeannine Mcgowan MD Internal Medicine, PGY1

## 2025-05-16 NOTE — DISCUSSION/SUMMARY
[de-identified] : The patient is symptomatic from osteoarthritis of both knees.  I have discussed the pathology, natural history and treatment options with her.  She is referred for physical therapy.  She will be reevaluated in 2 months.

## 2025-05-16 NOTE — HISTORY OF PRESENT ILLNESS
[Home] : at home, [unfilled] , at the time of the visit. [Medical Office: ([facility])___] : at the medical office located in  [Telephone (audio)] : This telephonic visit was provided via audio only technology. [Technical] : patient unable to effectively utilize tele-video due to technical issues. [Verbal consent obtained from patient] : the patient, [unfilled] [FreeTextEntry1] : HTN f/u [de-identified] : Patient is a 77 F PMH b/l breast CA, HTN, HLD, asthma, telephonic appointment for HTN. pt forgot that she had a telehealth appointment, unsure how to set it up. Appointment changed to telephonic.   recent ED for hyperkalemia, in office 5/6 with K of 7, upon repeat at ED was at 3.9 losartan was stopped, early in May 2025, has stopped talking amlodipine 5mg for a month due to side effect (HA/nausea/diarrhea) /80s, at times 110/70s BP medication currently on nothing

## 2025-05-16 NOTE — PHYSICAL EXAM
[LE] : Sensory: Intact in bilateral lower extremities [DP] : dorsalis pedis 2+ and symmetric bilaterally [PT] : posterior tibial 2+ and symmetric bilaterally [Normal] : Alert and in no acute distress [Poor Appearance] : well-appearing [Acute Distress] : not in acute distress [Obese] : not obese [de-identified] : The patient has no respiratory distress. Mood and affect are normal. The patient is alert and oriented to person, place and time. There is no pain with active or passive motion of the hips.  There is no tenderness of either hip.  Examination of the knees demonstrates mild anterolateral tenderness bilaterally.  Quadriceps and hamstring function are intact although she has quadriceps weakness.  There is no instability of collateral or cruciate ligaments.  Range of motion 0 to 115 degrees of both knees.  Calves are soft and nontender.  The skin is intact.  There is no lymphedema. [de-identified] : AP, lateral, tunnel and sunrise x-rays of both knees demonstrate no fracture or dislocation.  There are mild degenerative changes.

## 2025-05-16 NOTE — HISTORY OF PRESENT ILLNESS
[de-identified] : 77-year-old female presents for evaluation of bilateral knee pain x 1 year. She complains of intermittent aching pain in the knees worse with walking and climbing stairs. She hears cracking in the knees while exercising. She takes Tylenol for pain with some relief. Denies prior treatment.

## 2025-05-21 NOTE — REVIEW OF SYSTEMS
[Fever] : no fever [Fatigue] : no fatigue [Chest Pain] : no chest pain [Palpitations] : no palpitations [Shortness Of Breath] : no shortness of breath [Cough] : no cough [Abdominal Pain] : no abdominal pain

## 2025-05-21 NOTE — HISTORY OF PRESENT ILLNESS
[FreeTextEntry1] : HTN [de-identified] : 77F with PMH b/l breast cancer s/p L mastectomy, HTN, HLD, asthma, b/l carpal tunnel, osteoarthritis, peripheral neuropathy presenting for follow-up appointment regarding her HTN.  #HTN Patient was originally on HCTZ 12.5mg/toprol 50 mg but switched to amlodipine 5 mg due to her skin being dry while on HCTZ in 03/2025, unable to tolerate amlodipine due to SE of HA/nausea/diarrhea/palipatiation and feeling orthostatic, switched to losartan 25mg in 04/2025 but stopped due to hyperkalemia to 7 on 5/6/25; now off all HTN meds.  - Home BPs 118-135/70-80s - diet: last year was focusing on not eating meat, and drinking more juice; now eating regularly bread and meats and noted that she has gained 4 pounds. feels determined to change diet and lose weight - excercises as much as she can tolerate, but unsually when working with PT since otherwise with joint pains  #HLD - asked if she could be taken off rosuvastatin (no SE but does not like being on medications) and wants repeat lipid panel

## 2025-05-21 NOTE — HISTORY OF PRESENT ILLNESS
[FreeTextEntry1] : HTN [de-identified] : 77F with PMH b/l breast cancer s/p L mastectomy, HTN, HLD, asthma, b/l carpal tunnel, osteoarthritis, peripheral neuropathy presenting for follow-up appointment regarding her HTN.  #HTN Patient was originally on HCTZ 12.5mg/toprol 50 mg but switched to amlodipine 5 mg due to her skin being dry while on HCTZ in 03/2025, unable to tolerate amlodipine due to SE of HA/nausea/diarrhea/palipatiation and feeling orthostatic, switched to losartan 25mg in 04/2025 but stopped due to hyperkalemia to 7 on 5/6/25; now off all HTN meds.  - Home BPs 118-135/70-80s - diet: last year was focusing on not eating meat, and drinking more juice; now eating regularly bread and meats and noted that she has gained 4 pounds. feels determined to change diet and lose weight - excercises as much as she can tolerate, but unsually when working with PT since otherwise with joint pains  #HLD - asked if she could be taken off rosuvastatin (no SE but does not like being on medications) and wants repeat lipid panel

## 2025-05-21 NOTE — ASSESSMENT
[FreeTextEntry1] : 77F with PMH b/l breast cancer s/p L mastectomy, HTN, HLD, asthma, b/l carpal tunnel, osteoarthritis, peripheral neuropathy presenting for follow-up appointment regarding HTN.  - Seen via telehealth on 5/16 for HTN, stopped losartan early May 2025 for hyperkalemia of 7, stopped amlodipine 5 mg for SE of HA/nausea/diarrhea; stopped HCTZ due to increased skin dryness, metroprolol  - on no medications for HTN at this time   #HTN  - Home BPs 118-135/70-80s - BP in office 138/85, uncahnged on repeat - Patient was originally on HCTZ 12.5/toprol 50 mg but switched to amlodipine 5 mg due to her skin being dry while on HCTZ, unable to tolerate amlodipine due to SE of nausea/diarrhea, switched to losartan 25mg but stopped due to hyperkalemia to 7; now off all HTN meds PLAN - lifestyle counseling offered to patient increased reducing salty food and reducing intake of bread, rice, high transfats to help with weight loss. patient declined nutritionist services at this time  - start felodipine 2.5 mg. patient not wanting to return back to toprol 50 mg ER since concerned about the higher dosage and wanted medication that was <5 mg (explained to patient that you cannot compare different medications per dosage but she still preferred trialing felodipine first)  #HLD - patient wanting to come off rosuvastatin 5 mg but advised patient to first make lifestyle changes ans focus on losing weight before repeating lipid panel and then considering coming off statin in future, which she was amenable to   RTC in 5 weeks for CPE/BP check   Above d/w Dr. Sevilla

## 2025-05-21 NOTE — END OF VISIT
[] : Resident [FreeTextEntry3] : see above note after extensive review of the chart. limited treatment options for her HTN. shared decision making performed during this encounter. plan as outlined

## 2025-06-24 NOTE — PHYSICAL EXAM
[de-identified] :  B/L hands Positive Tinel's at the carpal tunnel Positive flexion compression test Thenar atrophy present Negative Wartenberg and Froment's sign No hypothenar atrophy APB strength 3 out of 5  B/L MF TTP A1 pulley Catching and locking present - cannot even flex left middle finger to palm  Full extension and full finger flexion to palm

## 2025-06-24 NOTE — DISCUSSION/SUMMARY
[FreeTextEntry1] : 77-year-old female past medical history of neuropathy and high cholesterol with bilateral carpal tunnel symptoms despite nighttime bracing and bilateral middle finger triggering with significant left middle finger stiffness due to persistent triggering status post multiple corticosteroid injections.  We discussed surgical management starting with the left hand first as the left middle finger significantly stiff she would like to move to soon as possible.   I have spent 35 minutes of time on the encounter which excludes teaching and/or separately reported services

## 2025-06-24 NOTE — HISTORY OF PRESENT ILLNESS
[FreeTextEntry1] : B/L MF triggering B/L CTS  Patient is a pleasant 77 year old female who presents with bilateral hand pain. She was previously being treated by Dr. Diaz for bilateral trigger finger of her middle finger and carpal tunnel of her right wrist. They had discussed surgical options but she stopped seeing him as he changed offices. She reports today as her hand pain has gotten significantly worse. She reports on and off numbness and tingling in her right hand. Her major complaint is achiness in her hands with any gripping activity.

## 2025-07-24 NOTE — PHYSICAL EXAM
[Normal] : supple, no neck mass and thyroid not enlarged [Normal Neck Lymph Nodes] : normal neck lymph nodes  [Normal Supraclavicular Lymph Nodes] : normal supraclavicular lymph nodes [Normal Axillary Lymph Nodes] : normal axillary lymph nodes [Normal] : oriented to person, place and time, with appropriate affect [FreeTextEntry1] : SC present for exam  [de-identified] : Normal S1,S2. Regular rate and rhythm [de-identified] : Right normal breast exam, left mastectomy with flat wall closure, no evidence of recurrence [de-identified] : Clear breath sounds bilaterally with normal respiratory effort.

## 2025-07-24 NOTE — HISTORY OF PRESENT ILLNESS
[de-identified] : Ms. MACY SANDOVAL is a 77-year-old woman who returns for a follow up visit  She was initially seen in consultation on 2/1/2022.  She has a prior history of invasive ductal carcinoma of the right breast status post lumpectomy 6/2019 with Dr. Tashi Fountain (pathology T1N0, ER+/AK+/HER2-, negative margins). She did not undergo radiation and is under the impression that the pathology was benign.     She is s/p Bilateral breast lumpectomies after Magseed placement, four sites on the left, one on the right and left axillary SLNB on 4/18/2022.  **It is of note that I strongly encouraged the patient to undergo mastectomies, but she was adamant about breast conservation. 1) Left breast medial lumpectomy: 2.5 cm IDC and DCIS, 2 left sentinel nodes w/ metastatic disease (T2N1a) ER+/AK+/HER2- (*DCIS involves the inferior margin) 2) Left breast lateral lumpectomy: DCIS 3) Right lumpectomy: LCIS, ADH  B/L mammo/sono 7/17/23:  Irregular focal asymmetry in the lower inner left breast by mammography corresponding to the palpable area of concern and corresponding to suspicious heterogeneous masses at 9:00 and 10:00 on sonography. Ultrasound-guided core biopsy of the mass at 9:00 is recommended. Further management of the mass at 10:00 will depend on the results of this biopsy. BIRADS 5   Left breast 9:00 N4 biopsy 12/6/23: Invasive moderately differentiated ductal carcinoma, with solid papillary and mucinous features. Measures 13mm.   Her past medical history includes hypertension, hyperlipidemia (patient defers medical management), intermittent asthma (uses rescue inhaler on occasion) and constipation (on linzess).  She has a family history of breast cancer involving her maternal aunt (post-menopausal)  She is adamant that she will not have a mastectomy.  breast MRI 1/2024  - bx proven malignancy to central inner breast extending to surface measuring up to 2.6 cm, additional mass in the subareolar left breast also highly suspicious for malignancy, additional clumped nonmass enhancement in the central slightly lateral left breast, spanning 6.3 cm suspicious for additional disease  MR biopsies 3/5/2024 ([facility]) - left 3:00 N6 (cork): microinvasive carcinoma, DCIS - Left RA (stoplight): invasive mammary ductal carcinoma w/ mucinous features in this sampling, DCIS   5/21/2024: Tele health discussion included Macy and her niece. Macy is still adamant of having a lumpectomy, but her niece understands the importance of the plan for a double mastectomy.   6/4/2024: Patient presents for discussion of mastectomy with her niece.  The patient now understands the rationale for the mastectomy I explained that it could be done with immediate reconstruction so the patient was referred to a plastic surgeon to discuss reconstructive options.  Left total mastectomy and L axillary node dissection on 8/23/2024. Pathology: 0/7 LN negative, invasive ductal carcinoma, 3.4 cm, invasive lobular carcinoma, 0.7 cm. negative margins. ER/AK + HER2-   9/10/2024: Macy presents with left breast mastectomy bed seroma and discomfort  9/17/2024: Macy presents continuous left breast mastectomy bed seroma and discomfort  10/8/2024: Macy presents with discomfort. Seroma continues to improve  1/14/2025: Here for a check up, recently fell at her laundromat in Nov 2024 and fractured her left shoulder.

## 2025-07-24 NOTE — ASSESSMENT
[FreeTextEntry1] : IMP:  76yo F w/ known left breast cancer s/p lumpectomies 6/2019 and 4/2022 -- with no adjuvant RT or endocrine therapy (pt with follow-up) now with known multifocal disease to Left breast   Left total mastectomy and L axillary node dissection on 8/23/2024. Pathology: 0/7 LN negative, invasive ductal carcinoma, 3.4 cm, invasive lobular carcinoma, 0.7 cm. negative margins. ER/WA + HER2-  9/3-RULA removed in office 9/10- 160 cc aspirated  9/17/24: 130 cc aspirated  Pt decided not to return to see Dr. Rothman and is declining endocrine therapy  PLAN: Return in one year R MMG/US now - last imaging 7/2023   All medical entries were at my, Dr. Bryon Kathleen, direction. I have reviewed the chart and agree that the record accurately reflects my personal performance of the history, physical exam, assessment and plan. Our office Nurse Practitioner was present of the duration of the office visit.

## 2025-07-24 NOTE — ASSESSMENT
[FreeTextEntry1] : IMP:  78yo F w/ known left breast cancer s/p lumpectomies 6/2019 and 4/2022 -- with no adjuvant RT or endocrine therapy (pt with follow-up) now with known multifocal disease to Left breast   Left total mastectomy and L axillary node dissection on 8/23/2024. Pathology: 0/7 LN negative, invasive ductal carcinoma, 3.4 cm, invasive lobular carcinoma, 0.7 cm. negative margins. ER/WA + HER2-  9/3-RULA removed in office 9/10- 160 cc aspirated  9/17/24: 130 cc aspirated  Pt decided not to return to see Dr. Rothman and is declining endocrine therapy  PLAN: Return in one year R MMG/US now - last imaging 7/2023   All medical entries were at my, Dr. Bryon Kathleen, direction. I have reviewed the chart and agree that the record accurately reflects my personal performance of the history, physical exam, assessment and plan. Our office Nurse Practitioner was present of the duration of the office visit.

## 2025-07-24 NOTE — PHYSICAL EXAM
[Normal] : supple, no neck mass and thyroid not enlarged [Normal Neck Lymph Nodes] : normal neck lymph nodes  [Normal Supraclavicular Lymph Nodes] : normal supraclavicular lymph nodes [Normal Axillary Lymph Nodes] : normal axillary lymph nodes [Normal] : oriented to person, place and time, with appropriate affect [FreeTextEntry1] : SC present for exam  [de-identified] : Normal S1,S2. Regular rate and rhythm [de-identified] : Right normal breast exam, left mastectomy with flat wall closure, no evidence of recurrence [de-identified] : Clear breath sounds bilaterally with normal respiratory effort.

## 2025-07-30 NOTE — ASSESSMENT
[FreeTextEntry1] : -FITO in 1 yr -  -Reminded pt of dietary modifications to avoid stone formation.  Stone diet modification:   This includes increasing fluid intake to produce 2 to 2.5 liters of urine per day (approx 3 liters intake), and should be primarily water.    Calcium intake should be approximately 1000 mg per day.    Oxalate intake should be reduced- most common sources in diet which have very high levels include peanuts/nuts, tea, coffee, chocolate, spinach, kalebeets, rhubarb, swiss chard. I've also given/directed to a list with other high oxalate. Animal flesh protein should be controlled- approx 4 to 6 ounces per day, with some vegetarian days included in the week. Studies have shown that vegetarians have half the risk of stones of people who eat only 4 ounces per day of meat or fish of any kind (beef, chicken, fish, shellfish, pork, etc), indicating that a vegetarian lifestyle can decrease future stone risks.   Finally, salt intake should be reduced as high levels in the diet will increase urinary calcium. <2400 mg per day on a low sodium diet is strongly recommended.   Citrate is a benefit; patrick and limes with most citrate and least sugar- recommend 'a lemon or lime a day'; easiest with concentrate, mixed into water or other beverages.   Lifestyle changes: regular exercise and weight loss both are independent risk-reducers for stones.   In addition, for further details online, go to <http://www.[employer].com/> ---> in the lower left column there is a link for "diet resources", and review or download.

## 2025-07-30 NOTE — REVIEW OF SYSTEMS
[Joint Pain] : joint pain [Back Pain] : back pain [Fever] : no fever [Chills] : no chills [Fatigue] : no fatigue [Vision Problems] : no vision problems [Chest Pain] : no chest pain [Palpitations] : no palpitations [Orthopnea] : no orthopnea [Shortness Of Breath] : no shortness of breath [Nausea] : no nausea [Vomiting] : no vomiting [Skin Rash] : no skin rash [Anxiety] : no anxiety [Depression] : no depression

## 2025-07-30 NOTE — ASSESSMENT
[FreeTextEntry1] : IMP:  76yo F w/ known left breast cancer s/p lumpectomies 6/2019 and 4/2022 -- with no adjuvant RT or endocrine therapy (pt with follow-up) now with known multifocal disease to Left breast   Left total mastectomy and L axillary node dissection on 8/23/2024. Pathology: 0/7 LN negative, invasive ductal carcinoma, 3.4 cm, invasive lobular carcinoma, 0.7 cm. negative margins. ER/NC + HER2-  9/3-RULA removed in office 9/10- 160 cc aspirated  9/17/24: 130 cc aspirated  Pt decided not to return to see Dr. Rothman and is declining endocrine therapy  PLAN: Return in one year R MMG/US now - last imaging 7/2023 PT referral given Prosthetic bra/breast prescription given   All medical entries were at my, Dr. Bryon Kathleen, direction. I have reviewed the chart and agree that the record accurately reflects my personal performance of the history, physical exam, assessment and plan. Our office Nurse Practitioner was present of the duration of the office visit.

## 2025-07-30 NOTE — HEALTH RISK ASSESSMENT
[Fair] :  ~his/her~ mood as fair [Yes] : Yes [Monthly or less (1 pt)] : Monthly or less (1 point) [1 or 2 (0 pts)] : 1 or 2 (0 points) [Never (0 pts)] : Never (0 points) [No] : In the past 12 months have you used drugs other than those required for medical reasons? No [0] : 2) Feeling down, depressed, or hopeless: Not at all (0) [PHQ-2 Negative - No further assessment needed] : PHQ-2 Negative - No further assessment needed [Never] : Never [None] : None [Fully functional (bathing, dressing, toileting, transferring, walking, feeding)] : Fully functional (bathing, dressing, toileting, transferring, walking, feeding) [Fully functional (using the telephone, shopping, preparing meals, housekeeping, doing laundry, using] : Fully functional and needs no help or supervision to perform IADLs (using the telephone, shopping, preparing meals, housekeeping, doing laundry, using transportation, managing medications and managing finances) [Audit-CScore] : 1 [[id number]] : 0 [Reports changes in hearing] : Reports no changes in hearing [Reports changes in vision] : Reports no changes in vision

## 2025-07-30 NOTE — HISTORY OF PRESENT ILLNESS
[FreeTextEntry1] : MACY SANDOVAL is a 77 year old F who presents as former pt of Dr. Azul:  1. Stones - small and stable FITO on 5/15/25 w no hydro and b/l LP stones: 3 and 4 mm respectively in RK and LK NO renal colic and no n/v/f/c  2. Hyperoxaluria - eats a lot of nuts and seeds  3. vaginal atrophy and dysuria -  last really bad was in 10/24 currently asymptomatic for UTI, but does have dryness with burning even when not voiding  4.  ?mild prolapse when bearing down only loses a few drops   24 hour urine collection reviewed: 6/18/25 volume: 3.5 L calcium: 173, normal sodium: 159, high normal (but much better than on all previous tests) oxalate: 75, very high and has been at times citrate: 715, excellent UA: 677,  Animal protein markers:  normal

## 2025-07-30 NOTE — ASSESSMENT
[FreeTextEntry1] : 77F with PMH b/l breast cancer s/p L mastectomy, HTN, HLD, asthma, b/l carpal tunnel, osteoarthritis, peripheral neuropathy presenting for CPE.   #HTN  - Home BPs 117-130/70-80s  - BP in office 132/85, unchanged on repeat  - Patient was originally on HCTZ 12.5/toprol 50 mg but switched to amlodipine 5 mg due to her skin being dry while on HCTZ, unable to tolerate amlodipine due to SE of nausea/diarrhea, switched to losartan 25mg but stopped due to hyperkalemia to 7; during 05/2025 visit started on felodipine 2.5mg  (did not take) PLAN - BPs in tolerable range, will hold off on prescribing medications. instructed pateint to continue takign BP routinely and to contact clinic if noticed BP conisistently >140/80   #Lumbar radiculopathy #B/l knee pain  - following with ortho  - PT referrals sent for LBP and knee pain   #HLD  - patient wanting to come off rosuvastatin 5 mg  - will check lipid    #Osteoporosis - 05/2025 with osteoporosis (femoral neck -2.9) - discussed with patient about dietary supplemntation with at least 800 IU vitamin d and 1200mg calcium (already taking vitamind D, discussed foods high in calcium since pt not wanting to take additional vitamin supplement) - encouraged weight based exercises   #HCM  - Labs: 05/2025 CBC and CMP largely wnl, 02/2025 TSH wnl, will check lipid, A1c  - Vaccinations: Tdap (06/2023), PPSV/PCV (2014/2016), due for shingrix (wants to wait until next visit) - Dexa: 05/2025 with osteoporosis (femoral neck -2.9), - Pap: 05/2025 neg (no need for further screening)  - Mammo: referral to be sent for R breast mammo/US with onc (to see on Wed) - Colonoscopy: 01/2024 with 6 tubular adenoma; repeat in 3 years or shared decision making given age    RTC in 6 months or PRN for routine f/u   Above D/w Dr. Crawford

## 2025-07-30 NOTE — HISTORY OF PRESENT ILLNESS
[de-identified] : Ms. MACY SANDOVAL is a 77-year-old woman who returns for a follow up visit  She was initially seen in consultation on 2/1/2022.  She has a prior history of invasive ductal carcinoma of the right breast status post lumpectomy 6/2019 with Dr. Tashi Fountain (pathology T1N0, ER+/OH+/HER2-, negative margins). She did not undergo radiation and is under the impression that the pathology was benign.     She is s/p Bilateral breast lumpectomies after Magseed placement, four sites on the left, one on the right and left axillary SLNB on 4/18/2022.  **It is of note that I strongly encouraged the patient to undergo mastectomies, but she was adamant about breast conservation. 1) Left breast medial lumpectomy: 2.5 cm IDC and DCIS, 2 left sentinel nodes w/ metastatic disease (T2N1a) ER+/OH+/HER2- (*DCIS involves the inferior margin) 2) Left breast lateral lumpectomy: DCIS 3) Right lumpectomy: LCIS, ADH  B/L mammo/sono 7/17/23:  Irregular focal asymmetry in the lower inner left breast by mammography corresponding to the palpable area of concern and corresponding to suspicious heterogeneous masses at 9:00 and 10:00 on sonography. Ultrasound-guided core biopsy of the mass at 9:00 is recommended. Further management of the mass at 10:00 will depend on the results of this biopsy. BIRADS 5   Left breast 9:00 N4 biopsy 12/6/23: Invasive moderately differentiated ductal carcinoma, with solid papillary and mucinous features. Measures 13mm.   Her past medical history includes hypertension, hyperlipidemia (patient defers medical management), intermittent asthma (uses rescue inhaler on occasion) and constipation (on linzess).  She has a family history of breast cancer involving her maternal aunt (post-menopausal)  She is adamant that she will not have a mastectomy.  breast MRI 1/2024  - bx proven malignancy to central inner breast extending to surface measuring up to 2.6 cm, additional mass in the subareolar left breast also highly suspicious for malignancy, additional clumped nonmass enhancement in the central slightly lateral left breast, spanning 6.3 cm suspicious for additional disease  MR biopsies 3/5/2024 ([facility]) - left 3:00 N6 (cork): microinvasive carcinoma, DCIS - Left RA (stoplight): invasive mammary ductal carcinoma w/ mucinous features in this sampling, DCIS   5/21/2024: Tele health discussion included Macy and her niece. Macy is still adamant of having a lumpectomy, but her niece understands the importance of the plan for a double mastectomy.   6/4/2024: Patient presents for discussion of mastectomy with her niece.  The patient now understands the rationale for the mastectomy I explained that it could be done with immediate reconstruction so the patient was referred to a plastic surgeon to discuss reconstructive options.  Left total mastectomy and L axillary node dissection on 8/23/2024. Pathology: 0/7 LN negative, invasive ductal carcinoma, 3.4 cm, invasive lobular carcinoma, 0.7 cm. negative margins. ER/OH + HER2-   9/10/2024: Macy presents with left breast mastectomy bed seroma and discomfort  9/17/2024: Macy presents continuous left breast mastectomy bed seroma and discomfort  10/8/2024: Macy presents with discomfort. Seroma continues to improve  1/14/2025: Here for a check up, recently fell at her laundromat in Nov 2024 and fractured her left shoulder.

## 2025-07-30 NOTE — HISTORY OF PRESENT ILLNESS
[FreeTextEntry1] : CPE [de-identified] : 77F with PMH b/l breast cancer s/p L mastectomy, HTN, HLD, asthma, b/l carpal tunnel, osteoarthritis, peripheral neuropathy presenting for CPE.   #HTN  - Home BPs 117-130/70-80s  - BP in office 132/85, unchanged on repeat  - not taking any medications, was prescibed felodipine 2.5mg but heard from a friend that the SE are very bad and never started taking - diet: does not follow a specific diet but tries to eat healthier - exercise: does at home exercises occasionally (leg lifts, PT exercises)  #Lumbar radiculopathy #B/l knee pain - requesting PT for back

## 2025-07-30 NOTE — PHYSICAL EXAM
[No Acute Distress] : no acute distress [Well-Appearing] : well-appearing [Normal Sclera/Conjunctiva] : normal sclera/conjunctiva [Normal Outer Ear/Nose] : the outer ears and nose were normal in appearance [No Respiratory Distress] : no respiratory distress  [No Accessory Muscle Use] : no accessory muscle use [Clear to Auscultation] : lungs were clear to auscultation bilaterally [Normal Rate] : normal rate  [Regular Rhythm] : with a regular rhythm [Normal S1, S2] : normal S1 and S2 [No Murmur] : no murmur heard [No Edema] : there was no peripheral edema [Soft] : abdomen soft [Non Tender] : non-tender [Non-distended] : non-distended [No Masses] : no abdominal mass palpated [No Rash] : no rash [Coordination Grossly Intact] : coordination grossly intact [No Focal Deficits] : no focal deficits [Normal Affect] : the affect was normal [Normal Insight/Judgement] : insight and judgment were intact [de-identified] : L breast mastectomy, lateral chest and end of scar with loose skin, no palpable masses or overlying skin changes

## 2025-07-30 NOTE — PHYSICAL EXAM
[No Acute Distress] : no acute distress [Well-Appearing] : well-appearing [Normal Sclera/Conjunctiva] : normal sclera/conjunctiva [Normal Outer Ear/Nose] : the outer ears and nose were normal in appearance [No Respiratory Distress] : no respiratory distress  [No Accessory Muscle Use] : no accessory muscle use [Clear to Auscultation] : lungs were clear to auscultation bilaterally [Normal Rate] : normal rate  [Regular Rhythm] : with a regular rhythm [Normal S1, S2] : normal S1 and S2 [No Murmur] : no murmur heard [No Edema] : there was no peripheral edema [Soft] : abdomen soft [Non Tender] : non-tender [Non-distended] : non-distended [No Masses] : no abdominal mass palpated [No Rash] : no rash [Coordination Grossly Intact] : coordination grossly intact [No Focal Deficits] : no focal deficits [Normal Affect] : the affect was normal [Normal Insight/Judgement] : insight and judgment were intact [de-identified] : L breast mastectomy, lateral chest and end of scar with loose skin, no palpable masses or overlying skin changes

## 2025-07-30 NOTE — PHYSICAL EXAM
[No Acute Distress] : no acute distress [Well-Appearing] : well-appearing [Normal Sclera/Conjunctiva] : normal sclera/conjunctiva [Normal Outer Ear/Nose] : the outer ears and nose were normal in appearance [No Respiratory Distress] : no respiratory distress  [No Accessory Muscle Use] : no accessory muscle use [Clear to Auscultation] : lungs were clear to auscultation bilaterally [Normal Rate] : normal rate  [Regular Rhythm] : with a regular rhythm [Normal S1, S2] : normal S1 and S2 [No Murmur] : no murmur heard [No Edema] : there was no peripheral edema [Soft] : abdomen soft [Non Tender] : non-tender [Non-distended] : non-distended [No Masses] : no abdominal mass palpated [No Rash] : no rash [Coordination Grossly Intact] : coordination grossly intact [No Focal Deficits] : no focal deficits [Normal Affect] : the affect was normal [Normal Insight/Judgement] : insight and judgment were intact [de-identified] : L breast mastectomy, lateral chest and end of scar with loose skin, no palpable masses or overlying skin changes

## 2025-07-30 NOTE — HEALTH RISK ASSESSMENT
[Fair] :  ~his/her~ mood as fair [Yes] : Yes [Monthly or less (1 pt)] : Monthly or less (1 point) [1 or 2 (0 pts)] : 1 or 2 (0 points) [Never (0 pts)] : Never (0 points) [No] : In the past 12 months have you used drugs other than those required for medical reasons? No [0] : 2) Feeling down, depressed, or hopeless: Not at all (0) [PHQ-2 Negative - No further assessment needed] : PHQ-2 Negative - No further assessment needed [Never] : Never [None] : None [Fully functional (bathing, dressing, toileting, transferring, walking, feeding)] : Fully functional (bathing, dressing, toileting, transferring, walking, feeding) [Fully functional (using the telephone, shopping, preparing meals, housekeeping, doing laundry, using] : Fully functional and needs no help or supervision to perform IADLs (using the telephone, shopping, preparing meals, housekeeping, doing laundry, using transportation, managing medications and managing finances) [Audit-CScore] : 1 [[id number]] : 0 [Reports changes in hearing] : Reports no changes in hearing [Reports changes in vision] : Reports no changes in vision

## 2025-07-30 NOTE — HEALTH RISK ASSESSMENT
[Fair] :  ~his/her~ mood as fair [Yes] : Yes [Monthly or less (1 pt)] : Monthly or less (1 point) [1 or 2 (0 pts)] : 1 or 2 (0 points) [Never (0 pts)] : Never (0 points) [No] : In the past 12 months have you used drugs other than those required for medical reasons? No [0] : 2) Feeling down, depressed, or hopeless: Not at all (0) [PHQ-2 Negative - No further assessment needed] : PHQ-2 Negative - No further assessment needed [Never] : Never [None] : None [Fully functional (bathing, dressing, toileting, transferring, walking, feeding)] : Fully functional (bathing, dressing, toileting, transferring, walking, feeding) [Fully functional (using the telephone, shopping, preparing meals, housekeeping, doing laundry, using] : Fully functional and needs no help or supervision to perform IADLs (using the telephone, shopping, preparing meals, housekeeping, doing laundry, using transportation, managing medications and managing finances) [Audit-CScore] : 1 [[id number]] : 0 [Reports changes in hearing] : Reports no changes in hearing [Reports changes in vision] : Reports no changes in vision

## 2025-07-30 NOTE — PHYSICAL EXAM
[FreeTextEntry1] : SC present for exam  [de-identified] : Normal S1,S2. Regular rate and rhythm [de-identified] : Right normal breast exam, left mastectomy with flat wall closure, no evidence of recurrence [de-identified] : Clear breath sounds bilaterally with normal respiratory effort.

## 2025-07-30 NOTE — HISTORY OF PRESENT ILLNESS
[FreeTextEntry1] : CPE [de-identified] : 77F with PMH b/l breast cancer s/p L mastectomy, HTN, HLD, asthma, b/l carpal tunnel, osteoarthritis, peripheral neuropathy presenting for CPE.   #HTN  - Home BPs 117-130/70-80s  - BP in office 132/85, unchanged on repeat  - not taking any medications, was prescibed felodipine 2.5mg but heard from a friend that the SE are very bad and never started taking - diet: does not follow a specific diet but tries to eat healthier - exercise: does at home exercises occasionally (leg lifts, PT exercises)  #Lumbar radiculopathy #B/l knee pain - requesting PT for back

## 2025-07-30 NOTE — HISTORY OF PRESENT ILLNESS
[FreeTextEntry1] : CPE [de-identified] : 77F with PMH b/l breast cancer s/p L mastectomy, HTN, HLD, asthma, b/l carpal tunnel, osteoarthritis, peripheral neuropathy presenting for CPE.   #HTN  - Home BPs 117-130/70-80s  - BP in office 132/85, unchanged on repeat  - not taking any medications, was prescibed felodipine 2.5mg but heard from a friend that the SE are very bad and never started taking - diet: does not follow a specific diet but tries to eat healthier - exercise: does at home exercises occasionally (leg lifts, PT exercises)  #Lumbar radiculopathy #B/l knee pain - requesting PT for back